# Patient Record
Sex: FEMALE | Race: WHITE | HISPANIC OR LATINO
[De-identification: names, ages, dates, MRNs, and addresses within clinical notes are randomized per-mention and may not be internally consistent; named-entity substitution may affect disease eponyms.]

---

## 2017-08-31 PROBLEM — Z00.00 ENCOUNTER FOR PREVENTIVE HEALTH EXAMINATION: Status: ACTIVE | Noted: 2017-08-31

## 2017-09-21 ENCOUNTER — APPOINTMENT (OUTPATIENT)
Dept: ORTHOPEDIC SURGERY | Facility: CLINIC | Age: 42
End: 2017-09-21
Payer: OTHER MISCELLANEOUS

## 2017-09-21 VITALS
HEIGHT: 61 IN | SYSTOLIC BLOOD PRESSURE: 114 MMHG | WEIGHT: 150 LBS | HEART RATE: 73 BPM | DIASTOLIC BLOOD PRESSURE: 74 MMHG | BODY MASS INDEX: 28.32 KG/M2

## 2017-09-21 DIAGNOSIS — Z78.9 OTHER SPECIFIED HEALTH STATUS: ICD-10-CM

## 2017-09-21 PROCEDURE — 99203 OFFICE O/P NEW LOW 30 MIN: CPT

## 2017-10-09 ENCOUNTER — OUTPATIENT (OUTPATIENT)
Dept: OUTPATIENT SERVICES | Facility: HOSPITAL | Age: 42
LOS: 1 days | End: 2017-10-09
Payer: COMMERCIAL

## 2017-10-09 VITALS
WEIGHT: 147.93 LBS | HEIGHT: 61 IN | TEMPERATURE: 98 F | SYSTOLIC BLOOD PRESSURE: 126 MMHG | HEART RATE: 74 BPM | DIASTOLIC BLOOD PRESSURE: 70 MMHG | RESPIRATION RATE: 18 BRPM | OXYGEN SATURATION: 98 %

## 2017-10-09 DIAGNOSIS — M77.9 ENTHESOPATHY, UNSPECIFIED: ICD-10-CM

## 2017-10-09 DIAGNOSIS — Z01.818 ENCOUNTER FOR OTHER PREPROCEDURAL EXAMINATION: ICD-10-CM

## 2017-10-09 DIAGNOSIS — T14.8XXA OTHER INJURY OF UNSPECIFIED BODY REGION, INITIAL ENCOUNTER: Chronic | ICD-10-CM

## 2017-10-09 LAB
HCT VFR BLD CALC: 36.1 % — SIGNIFICANT CHANGE UP (ref 34.5–45)
HGB BLD-MCNC: 12 G/DL — SIGNIFICANT CHANGE UP (ref 11.5–15.5)
MCHC RBC-ENTMCNC: 26.9 PG — LOW (ref 27–34)
MCHC RBC-ENTMCNC: 33.2 GM/DL — SIGNIFICANT CHANGE UP (ref 32–36)
MCV RBC AUTO: 80.9 FL — SIGNIFICANT CHANGE UP (ref 80–100)
PLATELET # BLD AUTO: 246 K/UL — SIGNIFICANT CHANGE UP (ref 150–400)
RBC # BLD: 4.46 M/UL — SIGNIFICANT CHANGE UP (ref 3.8–5.2)
RBC # FLD: 14.3 % — SIGNIFICANT CHANGE UP (ref 10.3–14.5)
WBC # BLD: 5.76 K/UL — SIGNIFICANT CHANGE UP (ref 3.8–10.5)
WBC # FLD AUTO: 5.76 K/UL — SIGNIFICANT CHANGE UP (ref 3.8–10.5)

## 2017-10-09 PROCEDURE — 85027 COMPLETE CBC AUTOMATED: CPT

## 2017-10-09 PROCEDURE — G0463: CPT

## 2017-10-09 RX ORDER — LIDOCAINE HCL 20 MG/ML
0.2 VIAL (ML) INJECTION ONCE
Qty: 0 | Refills: 0 | Status: DISCONTINUED | OUTPATIENT
Start: 2017-10-16 | End: 2017-10-31

## 2017-10-09 RX ORDER — ACETAMINOPHEN 500 MG
975 TABLET ORAL ONCE
Qty: 0 | Refills: 0 | Status: COMPLETED | OUTPATIENT
Start: 2017-10-16 | End: 2017-10-16

## 2017-10-09 RX ORDER — SODIUM CHLORIDE 9 MG/ML
3 INJECTION INTRAMUSCULAR; INTRAVENOUS; SUBCUTANEOUS EVERY 8 HOURS
Qty: 0 | Refills: 0 | Status: DISCONTINUED | OUTPATIENT
Start: 2017-10-16 | End: 2017-10-31

## 2017-10-09 NOTE — H&P PST ADULT - HISTORY OF PRESENT ILLNESS
This is a 41 y/o female is a , left hand got caught in the machine 5/3/2017 sustained fracture left  fourth and fifth metacarpals, s/p ORIF left fourth and fifth metacarpals with pins removal, still unable to  bend 4th and 5th fingers, she presents today for surgery

## 2017-10-09 NOTE — H&P PST ADULT - NSANTHOSAYNRD_GEN_A_CORE
No. NIMISHA screening performed.  STOP BANG Legend: 0-2 = LOW Risk; 3-4 = INTERMEDIATE Risk; 5-8 = HIGH Risk

## 2017-10-12 RX ORDER — OXYCODONE AND ACETAMINOPHEN 5; 325 MG/1; MG/1
5-325 TABLET ORAL 4 TIMES DAILY
Qty: 20 | Refills: 0 | Status: ACTIVE | COMMUNITY
Start: 2017-10-12 | End: 1900-01-01

## 2017-10-16 ENCOUNTER — OUTPATIENT (OUTPATIENT)
Dept: OUTPATIENT SERVICES | Facility: HOSPITAL | Age: 42
LOS: 1 days | End: 2017-10-16
Payer: COMMERCIAL

## 2017-10-16 ENCOUNTER — APPOINTMENT (OUTPATIENT)
Dept: ORTHOPEDIC SURGERY | Facility: HOSPITAL | Age: 42
End: 2017-10-16

## 2017-10-16 ENCOUNTER — TRANSCRIPTION ENCOUNTER (OUTPATIENT)
Age: 42
End: 2017-10-16

## 2017-10-16 VITALS
DIASTOLIC BLOOD PRESSURE: 65 MMHG | TEMPERATURE: 74 F | OXYGEN SATURATION: 100 % | RESPIRATION RATE: 15 BRPM | SYSTOLIC BLOOD PRESSURE: 123 MMHG | HEART RATE: 64 BPM

## 2017-10-16 VITALS
OXYGEN SATURATION: 100 % | RESPIRATION RATE: 16 BRPM | SYSTOLIC BLOOD PRESSURE: 109 MMHG | WEIGHT: 147.93 LBS | HEART RATE: 74 BPM | HEIGHT: 61 IN | DIASTOLIC BLOOD PRESSURE: 70 MMHG | TEMPERATURE: 98 F

## 2017-10-16 DIAGNOSIS — M77.9 ENTHESOPATHY, UNSPECIFIED: ICD-10-CM

## 2017-10-16 DIAGNOSIS — T14.8XXA OTHER INJURY OF UNSPECIFIED BODY REGION, INITIAL ENCOUNTER: Chronic | ICD-10-CM

## 2017-10-16 PROCEDURE — 26442 RELEASE PALM & FINGER TENDON: CPT | Mod: F3

## 2017-10-16 PROCEDURE — 26440 RELEASE PALM/FINGER TENDON: CPT | Mod: F3

## 2017-10-16 RX ORDER — SODIUM CHLORIDE 9 MG/ML
1000 INJECTION, SOLUTION INTRAVENOUS
Qty: 0 | Refills: 0 | Status: DISCONTINUED | OUTPATIENT
Start: 2017-10-16 | End: 2017-10-31

## 2017-10-16 RX ORDER — CELECOXIB 200 MG/1
200 CAPSULE ORAL ONCE
Qty: 0 | Refills: 0 | Status: DISCONTINUED | OUTPATIENT
Start: 2017-10-16 | End: 2017-10-31

## 2017-10-16 RX ORDER — OXYCODONE HYDROCHLORIDE 5 MG/1
5 TABLET ORAL ONCE
Qty: 0 | Refills: 0 | Status: DISCONTINUED | OUTPATIENT
Start: 2017-10-16 | End: 2017-10-16

## 2017-10-16 RX ORDER — ONDANSETRON 8 MG/1
4 TABLET, FILM COATED ORAL ONCE
Qty: 0 | Refills: 0 | Status: DISCONTINUED | OUTPATIENT
Start: 2017-10-16 | End: 2017-10-31

## 2017-10-16 RX ORDER — CELECOXIB 200 MG/1
200 CAPSULE ORAL ONCE
Qty: 0 | Refills: 0 | Status: COMPLETED | OUTPATIENT
Start: 2017-10-16 | End: 2017-10-16

## 2017-10-16 RX ADMIN — Medication 975 MILLIGRAM(S): at 11:44

## 2017-10-16 RX ADMIN — CELECOXIB 200 MILLIGRAM(S): 200 CAPSULE ORAL at 11:44

## 2017-10-16 NOTE — ASU DISCHARGE PLAN (ADULT/PEDIATRIC). - FOLLOWUP APPOINTMENT CLINIC/PHYSICIAN
Please follow up with Dr. Diaz within 1-2 weeks. You may call [phone] to schedule an appointment. Please go to your scheduled physical therapy appointment tomorrow, 10/17/17.

## 2017-10-16 NOTE — ASU DISCHARGE PLAN (ADULT/PEDIATRIC). - INSTRUCTIONS
You may resume a regular diet You may resume a regular diet and regular activities. Please do not participate in heavy lifting or strenuous exercise. Do not drive while taking pain medication.    Please go to the emergency department if you experience pain not controlled by pain medication, bleeding that will not stop, fevers or chills.

## 2017-10-16 NOTE — BRIEF OPERATIVE NOTE - PROCEDURE
<<-----Click on this checkbox to enter Procedure Lysis of adhesions of hand  10/16/2017    Active  ROSS

## 2017-10-16 NOTE — ASU DISCHARGE PLAN (ADULT/PEDIATRIC). - ITEMS TO FOLLOWUP WITH YOUR PHYSICIAN'S
Please follow up with Dr. Diaz within 1-2 weeks. You may call [phone] to schedule an appointment. Please go to your scheduled physical therapy appointment tomorrow, 10/17/17.    You may resume a regular diet and regular activities. Please do not participate in heavy lifting or strenuous exercise. Do not drive while taking pain medication.    Please go to the emergency department if you experience pain not controlled by pain medication, bleeding that will not stop, fevers or chills.

## 2017-10-24 ENCOUNTER — APPOINTMENT (OUTPATIENT)
Dept: ORTHOPEDIC SURGERY | Facility: CLINIC | Age: 42
End: 2017-10-24
Payer: OTHER MISCELLANEOUS

## 2017-10-24 PROCEDURE — 99024 POSTOP FOLLOW-UP VISIT: CPT

## 2017-11-21 ENCOUNTER — APPOINTMENT (OUTPATIENT)
Dept: ORTHOPEDIC SURGERY | Facility: CLINIC | Age: 42
End: 2017-11-21
Payer: OTHER MISCELLANEOUS

## 2017-11-21 PROCEDURE — 99024 POSTOP FOLLOW-UP VISIT: CPT

## 2018-07-16 PROBLEM — T14.8XXA OTHER INJURY OF UNSPECIFIED BODY REGION, INITIAL ENCOUNTER: Chronic | Status: ACTIVE | Noted: 2017-10-09

## 2018-07-31 NOTE — ASU PATIENT PROFILE, ADULT - NS TRANSFER PATIENT BELONGINGS
- Noted on UA  - Consider ordering UCx   - Ceftriaxone 1g Q24 continued  - Afebrile overnight  - WBC improving/down-trending    Clothing

## 2019-03-26 ENCOUNTER — APPOINTMENT (OUTPATIENT)
Dept: ORTHOPEDIC SURGERY | Facility: CLINIC | Age: 44
End: 2019-03-26
Payer: OTHER MISCELLANEOUS

## 2019-03-26 DIAGNOSIS — M67.80 OTHER SPECIFIED DISORDERS OF SYNOVIUM AND TENDON, UNSPECIFIED SITE: ICD-10-CM

## 2019-03-26 DIAGNOSIS — G56.02 CARPAL TUNNEL SYNDROME, LEFT UPPER LIMB: ICD-10-CM

## 2019-03-26 PROCEDURE — 20526 THER INJECTION CARP TUNNEL: CPT | Mod: LT

## 2019-03-26 PROCEDURE — 99214 OFFICE O/P EST MOD 30 MIN: CPT | Mod: 25

## 2019-07-22 NOTE — PRE-ANESTHESIA EVALUATION ADULT - NSANTHSUBSTSD_GEN_ALL_CORE
Chief Complaint   Patient presents with   • Hand Pain     right hand pain and swelling after hitting it on a bookshelf  on 7/18/19. Hurts to write or even drive a car.      HISTORY OF PRESENT ILLNESS:  Celina is a 70 year old White  female here today for right sided hand pain. She states last week Thursday she was picking something up off the floor when she struck the lateral side of her right hand on a bookshelf.  She reports pain radiates up side of hand in to 5th finger.  History of chronic arthritis with swelling and distal finger deformity, however, she reports swelling is worse than normal.  She states pain is currently 5/10 and describes as an aching pain.  Reports pain is limiting her ROM and strength to that finger. Reports that writing, driving and moving finger make pain worse.  Reports using OTC ibuprofen 400 mg, last dose was yesterday at 2200.  She reports good pain control with ibuprofen. She reports she applied ice over the weekend and that helped with swelling and pain.  Repots initially affected site had bruising and discoloration, however, this appears to have improved.  Denies change in sensation or open wound to site.     I have reviewed the patient's allergies, surgical, social and family history, updating these as appropriate.  See Histories section of the EMR for a display of this information. Medications and past medical history is noted below.    Current Outpatient Medications   Medication Sig Dispense Refill   • ipratropium (ATROVENT) 0.06 % nasal spray USE 2 SPRAYS IN EACH NOSTRIL TWICE A DAY AS NEEDED 45 mL 0   • omeprazole (PRILOSEC) 20 MG capsule TAKE 1 CAPSULE DAILY 90 capsule 1   • estradiol (YUVAFEM) 10 MCG vaginal tablet Place 1 tablet vaginally 2 days a week. 24 tablet 3   • desipramine (NORPRAMIN) 25 MG tablet Take 1 tablet by mouth nightly. 90 tablet 1   • atenolol (TENORMIN) 100 MG tablet Take 1 tablet by mouth daily. 90 tablet 3   • levothyroxine (SYNTHROID, LEVOTHROID) 100  MCG tablet Take 1 tablet by mouth daily. 90 tablet 1   • amLODIPine (NORVASC) 10 MG tablet Take 1 tablet by mouth daily. 90 tablet 3   • acetaminophen (TYLENOL) 500 MG tablet Take 1,000 mg by mouth every 6 hours as needed for Pain.     • ibuprofen (MOTRIN) 200 MG tablet Take 400 mg by mouth every 6 hours as needed for Pain.     • Gabapentin, PHN, (GRALISE) 600 MG TABS Take 2,400 mg by mouth nightly. (Patient taking differently: Take 1,500 mg by mouth 2 times daily. ) 120 tablet 11   • CALCIUM CITRATE-VITAMIN D PO Take  by mouth.     • Hypertonic Nasal Wash (SINUS RINSE KIT NA) Spray  in each nostril.     • Multiple Vitamin (MULTI-VITAMIN DAILY PO) Take 1 capsule by mouth daily.       No current facility-administered medications for this visit.         Past Medical History:   Diagnosis Date   • Arthritis    • Dry eye 08/2018   • Duodenitis    • Failed moderate sedation during procedure     took longer to wake up after elbow surgery   • Hypertension    • Hypothyroidism    • Osteopenia 09/24/2012   • Trigeminal neuralgia 9/18/2013   • Vocal cord dysfunction        REVIEW OF SYSTEMS:  Constitutional:  Denies fever or chills.   Musculoskeletal:  Denies joint or back pain except: reports pain to right, lateral hand and 5th finger.  Integument:  Denies rash.     PHYSICAL EXAMINATION:  Visit Vitals  /86 (BP Location: [facility] [place], Patient Position: Sitting, Cuff Size: Regular)   Pulse 66   Temp 98.2 °F (36.8 °C) (Temporal)   Resp 16   Ht 5' 6\" (1.676 m)   Wt 86.1 kg   SpO2 98%   BMI 30.64 kg/m²      Constitutional:  Well-developed, well-nourished, no acute distress, non-toxic appearance.  Musculoskeletal:  Right hand and 5th finger: Swelling, tenderness and distal finger chronic deformity. No discoloration, no change in sensation, cap refill <3 seconds, radial pulse +2. Flexion and extension at baseline, pain when making a fist.  Integument:  Well-hydrated, no rash.     HEALTH MAINTENANCE:   Health maintenance was reviewed  with patient today. Patient declined  recommendations.  I explained the risks and benefits of the procedures.  Shingles vaccine not available at clinic currently.    ASSESSMENT/PLAN:  Injury of right hand, initial encounter  Pain in finger of right hand  -X-ray completed today. No acute changes noted.  Chronic degenerative changes noted.  -Splint applied to 5th finger for support.  Encouraged wearing during activity and at bedtime for stability.  May stop using splint if causing pain or additional symptoms.  -Continue using OTC ibuprofen for pain control.  Do not exceed 2400 mg/daily.  -Recommended RICE to affected site. See patient instructions. Ice no longer than 20 minutes at a time.  -Follow-up in 2 weeks if no symptom improvement. Discussed that at times fractures may not show up on initial imaging. Discussed that if symptoms persist, may recommend an orthopedic referral.     Return in about 2 weeks (around 8/5/2019) for Follow-up for hand pain.   Patient expressed understanding and is in agreement to plan above.         No

## 2019-12-02 NOTE — H&P PST ADULT - PROBLEM SELECTOR PROBLEM 1
Peripheral Block  Date/Time: 12/2/2019 2:07 PM  Performed by: Zachary Lo M.D.  Authorized by: Zachary Lo M.D.     Patient Location:  Pre-op  Start Time:  12/2/2019 2:07 PM  End Time:  12/2/2019 2:10 PM  Reason for Block: at surgeon's request and post-op pain management    patient identified, IV checked, site marked, risks and benefits discussed, surgical consent, monitors and equipment checked, pre-op evaluation and timeout performed    Patient Position:  Supine  Prep: ChloraPrep    Monitoring:  Heart rate, continuous pulse ox and cardiac monitor  Block Region:  Lower Extremity  Lower Extremity - Block Type:  Selective FEMORAL nerve block at the Adductor Canal    Laterality:  Right  Procedures: ultrasound guided  Image captured, interpreted and electronically stored.  Local Infiltration:  Lidocaine  Strength:  1 %  Dose:  3 ml  Block Type:  Single-shot  Needle Length:  100mm  Needle Gauge:  21 G  Needle Localization:  Ultrasound guidance  Injection Assessment:  Negative aspiration for heme, no paresthesia on injection, incremental injection and local visualized surrounding nerve on ultrasound  Evidence of intravascular injection: No     US Guided Selective Femoral Nerve Block at Adductor Canal:   US probe placed at mid-thigh level on externally rotated leg and femur identified.  Probe directed medially until Sartorius Muscle (SM), Femoral Artery (FA) and Saphenous Nerve (SN) identified in Adductor Canal (AC).  Needle inserted anterolateral to probe in an in plane approach into a subsartorial perivascular perineural position.  After negative aspiration LA injected with ease and visualized spreading within the AC.           Enthesopathy

## 2021-03-26 NOTE — H&P PST ADULT - BMI (KG/M2)

## 2025-07-02 NOTE — ASU PREOP CHECKLIST - PATIENT'S PERSONAL PROPERTY GIVEN TO
Vaseline dressing and bacitracin applied to wounds on pt's L and R upper and lower arms; gauze wraps used to keep dressings in place   on unit